# Patient Record
Sex: MALE | URBAN - NONMETROPOLITAN AREA
[De-identification: names, ages, dates, MRNs, and addresses within clinical notes are randomized per-mention and may not be internally consistent; named-entity substitution may affect disease eponyms.]

---

## 2019-07-09 ENCOUNTER — APPOINTMENT (RX ONLY)
Dept: URBAN - NONMETROPOLITAN AREA CLINIC 13 | Facility: CLINIC | Age: 34
Setting detail: DERMATOLOGY
End: 2019-07-09

## 2019-07-09 VITALS — HEIGHT: 70 IN | WEIGHT: 180 LBS

## 2019-07-09 DIAGNOSIS — L21.8 OTHER SEBORRHEIC DERMATITIS: ICD-10-CM

## 2019-07-09 DIAGNOSIS — L71.8 OTHER ROSACEA: ICD-10-CM

## 2019-07-09 PROCEDURE — ? RECOMMENDATIONS

## 2019-07-09 PROCEDURE — ? PRESCRIPTION

## 2019-07-09 PROCEDURE — ? COUNSELING

## 2019-07-09 PROCEDURE — ? PRESCRIPTION MEDICATION MANAGEMENT

## 2019-07-09 PROCEDURE — 99201: CPT

## 2019-07-09 RX ORDER — METRONIDAZOLE 7.5 MG/G
1 CREAM TOPICAL QHS
Qty: 45 | Refills: 4 | Status: ERX | COMMUNITY
Start: 2019-07-09

## 2019-07-09 RX ORDER — SULFACETAMIDE SODIUM, SULFUR 100; 50 MG/G; MG/G
1 EMULSION TOPICAL DAILY
Qty: 227 | Refills: 3 | Status: ERX | COMMUNITY
Start: 2019-07-09

## 2019-07-09 RX ORDER — HYDROCORTISONE 25 MG/G
1 CREAM TOPICAL BID
Qty: 30 | Refills: 4 | Status: ERX | COMMUNITY
Start: 2019-07-09

## 2019-07-09 RX ADMIN — METRONIDAZOLE 1: 7.5 CREAM TOPICAL at 18:52

## 2019-07-09 RX ADMIN — HYDROCORTISONE 1: 25 CREAM TOPICAL at 18:53

## 2019-07-09 RX ADMIN — SULFACETAMIDE SODIUM, SULFUR 1: 100; 50 EMULSION TOPICAL at 18:52

## 2019-07-09 ASSESSMENT — LOCATION SIMPLE DESCRIPTION DERM
LOCATION SIMPLE: RIGHT CHEEK
LOCATION SIMPLE: CHIN
LOCATION SIMPLE: LEFT CHEEK

## 2019-07-09 ASSESSMENT — LOCATION ZONE DERM: LOCATION ZONE: FACE

## 2019-07-09 ASSESSMENT — LOCATION DETAILED DESCRIPTION DERM
LOCATION DETAILED: LEFT CHIN
LOCATION DETAILED: RIGHT INFERIOR CENTRAL MALAR CHEEK
LOCATION DETAILED: LEFT CENTRAL MALAR CHEEK
LOCATION DETAILED: LEFT INFERIOR CENTRAL MALAR CHEEK

## 2019-07-09 NOTE — HPI: RASH
What Type Of Note Output Would You Prefer (Optional)?: Standard Output
How Severe Is Your Rash?: moderate
Is This A New Presentation, Or A Follow-Up?: Rash
Additional History: PT STATES THAT RASH WAS PREVIOUS DIAGNOSED AS SEB DERM BY ANOTHER DERMATOLOGIST AND HE WAS PRESCRIBED AVAR CREAM, WHICH HE FEELS LIKE HELPS, BUT NOT VERY MUCH. HAS NOT USED THE AVAR CREAM X 3 MONTHS. WONDERING WHAT OTHER OPTIONS HE HAS THAT MAY WORK BETTER

## 2019-07-09 NOTE — PROCEDURE: PRESCRIPTION MEDICATION MANAGEMENT
Initiate Treatment: AVAR CLEANSER QD\\n\\nMETRONIDAZOLE 0.75% QD
Render In Strict Bullet Format?: No
Plan: PT WILL CALL IN 1 MONTH IF STILL FLARING WITH METRONIDAZOLE USE, IN WHICH CASE WE WILL DOCUMENT AND SEND IN RX FOR SOOLANTRA TO USE QD
Detail Level: Zone

## 2019-07-09 NOTE — PROCEDURE: RECOMMENDATIONS
Recommendations (Free Text): ELTA MD CLEAR FOR SUNSCREEN
Detail Level: Zone
Recommendation Preamble: The following recommendations were made during the visit:

## 2021-04-21 RX ORDER — HYDROCORTISONE 25 MG/G
CREAM TOPICAL BID
Qty: 30 | Refills: 4 | Status: ERX

## 2021-05-18 ENCOUNTER — RX ONLY (OUTPATIENT)
Age: 36
Setting detail: RX ONLY
End: 2021-05-18

## 2021-05-18 RX ORDER — HYDROCORTISONE 25 MG/G
1 CREAM TOPICAL BID
Qty: 30 | Refills: 0 | Status: ERX